# Patient Record
Sex: FEMALE | Race: WHITE | Employment: UNEMPLOYED | URBAN - METROPOLITAN AREA
[De-identification: names, ages, dates, MRNs, and addresses within clinical notes are randomized per-mention and may not be internally consistent; named-entity substitution may affect disease eponyms.]

---

## 2020-12-28 ENCOUNTER — HOSPITAL ENCOUNTER (EMERGENCY)
Age: 25
Discharge: HOME | End: 2020-12-28
Payer: COMMERCIAL

## 2020-12-28 ENCOUNTER — APPOINTMENT (OUTPATIENT)
Dept: GENERAL RADIOLOGY | Age: 25
End: 2020-12-28
Payer: COMMERCIAL

## 2020-12-28 VITALS
DIASTOLIC BLOOD PRESSURE: 70 MMHG | SYSTOLIC BLOOD PRESSURE: 110 MMHG | BODY MASS INDEX: 19.26 KG/M2 | HEART RATE: 77 BPM | RESPIRATION RATE: 16 BRPM | OXYGEN SATURATION: 99 % | TEMPERATURE: 97.7 F | HEIGHT: 69 IN | WEIGHT: 130 LBS

## 2020-12-28 PROCEDURE — 73060 X-RAY EXAM OF HUMERUS: CPT

## 2020-12-28 PROCEDURE — 73080 X-RAY EXAM OF ELBOW: CPT

## 2020-12-28 PROCEDURE — 99212 OFFICE O/P EST SF 10 MIN: CPT

## 2020-12-28 RX ORDER — ACETAMINOPHEN 500 MG
1000 TABLET ORAL EVERY 6 HOURS PRN
COMMUNITY

## 2020-12-28 RX ORDER — NAPROXEN 500 MG/1
500 TABLET ORAL 2 TIMES DAILY
Qty: 14 TABLET | Refills: 0 | Status: SHIPPED | OUTPATIENT
Start: 2020-12-28 | End: 2021-01-04

## 2020-12-28 RX ORDER — SPIRONOLACTONE 100 MG/1
100 TABLET, FILM COATED ORAL DAILY
COMMUNITY

## 2020-12-28 ASSESSMENT — PAIN DESCRIPTION - DESCRIPTORS: DESCRIPTORS: DISCOMFORT;TENDER

## 2020-12-28 ASSESSMENT — PAIN DESCRIPTION - PAIN TYPE: TYPE: ACUTE PAIN

## 2020-12-28 ASSESSMENT — PAIN DESCRIPTION - ORIENTATION: ORIENTATION: RIGHT

## 2020-12-28 ASSESSMENT — PAIN DESCRIPTION - PROGRESSION: CLINICAL_PROGRESSION: GRADUALLY WORSENING

## 2020-12-28 ASSESSMENT — PAIN DESCRIPTION - ONSET: ONSET: SUDDEN

## 2020-12-28 ASSESSMENT — PAIN DESCRIPTION - LOCATION: LOCATION: ELBOW

## 2020-12-28 ASSESSMENT — PAIN SCALES - GENERAL: PAINLEVEL_OUTOF10: 6

## 2020-12-28 ASSESSMENT — PAIN DESCRIPTION - FREQUENCY: FREQUENCY: CONTINUOUS

## 2020-12-28 NOTE — ED PROVIDER NOTES
Department of Emergency Medicine   [address] [place] [address]  Provider Note  Admit Date/RoomTime: 2020  3:32 PM  Room:   NAME: Leda Raymundo  : 1995  MRN: [id number]     Chief Complaint:  Fall (States she fell today while on a skateboard and injuried right arm. Having pain in right elbow area.)    History of Present Illness       Leda Raymundo is a 23 y.o. old female with a past medical history of:   Past Medical History:   Diagnosis Date    Acne     Anxiety     Rhabdomyolysis     presents to the urgent care center by private vehicle, for evaluation of an injury to her right elbow and upper arm. She was riding a skateboard and fell off  And attempted to catch herself with the right hand the main pain seems to be centered on the right elbow it also has some pain radiating up into the mid part of the upper arm she denies any head or neck pain he did not have any head injury or neck injury. ROS   Pertinent positives and negatives are stated within HPI, all other systems reviewed and are negative. Past Surgical History:   Procedure Laterality Date    WISDOM TOOTH EXTRACTION     Social History:  reports that she has never smoked. She has never used smokeless tobacco. She reports that she does not drink alcohol or use drugs. Family History: family history is not on file. Allergies: Patient has no known allergies. Physical Exam           ED Triage Vitals [20 1537]   BP Temp Temp Source Pulse Resp SpO2 Height Weight   110/70 97.7 °F (36.5 °C) Infrared 77 16 99 % 5' 9\" (1.753 m) 130 lb (59 kg)      Oxygen Saturation Interpretation: Normal.    · Constitutional:  Alert, development consistent with age. · HEENT:  NC/NT. Airway patent. · Neck:  Normal ROM. Supple. · Extremity(s):  Right: elbow. Tenderness:  mild. Swelling: None. Deformity: No.                 ROM: full range with pain.                Skin:  no erythema, rash or wounds noted. Neurovascular: Motor deficit: none. Sensory deficit: none. Pulse deficit: none. Capillary refill: normal.  · Lymphatics: No lymphangitis or adenopathy noted. · Neurological:  Oriented. Motor functions intact. Lab / Imaging Results   (All laboratory and radiology results have been personally reviewed by myself)  Labs:  No results found for this visit on 12/28/20. Imaging: All Radiology results interpreted by Radiologist unless otherwise noted. XR HUMERUS RIGHT (MIN 2 VIEWS)   Final Result   No acute osseous abnormality. XR ELBOW RIGHT (MIN 3 VIEWS)   Final Result   No acute abnormality. ED Course / Medical Decision Making   Medications - No data to display     Consult:   None        MDM:      Patient fell off of her skateboard injured her right arm at the elbow and upper arm x-rays were obtained of the humerus and the elbow and they were negative. I discussed the results with the patient I ordered her Naprosyn to take as needed for pain and she should follow-up with her PCP    Assessment      1. Elbow sprain, unspecified laterality, initial encounter      Plan   Discharge to home and advised to contact Aurea Walton DO  [address] [place] [address]  [phone]    Schedule an appointment as soon as possible for a visit      Patient condition is good    New Medications     New Prescriptions    NAPROXEN (NAPROSYN) 500 MG TABLET    Take 1 tablet by mouth 2 times daily for 7 days PRN/pain     Electronically signed by PANFILO Murray CNP   DD: 12/28/20  **This report was transcribed using voice recognition software. Every effort was made to ensure accuracy; however, inadvertent computerized transcription errors may be present.   END OF ED PROVIDER NOTE     PANFILO Murray CNP  12/28/20 [id number]

## 2021-09-09 ENCOUNTER — HOSPITAL ENCOUNTER (OUTPATIENT)
Age: 26
Discharge: HOME | End: 2021-09-11
Payer: COMMERCIAL

## 2021-09-09 ENCOUNTER — HOSPITAL ENCOUNTER (OUTPATIENT)
Dept: GENERAL RADIOLOGY | Age: 26
Discharge: HOME | End: 2021-09-11
Payer: COMMERCIAL

## 2021-09-09 DIAGNOSIS — K59.04 CHRONIC IDIOPATHIC CONSTIPATION: ICD-10-CM

## 2021-09-09 PROCEDURE — 74018 RADEX ABDOMEN 1 VIEW: CPT
